# Patient Record
(demographics unavailable — no encounter records)

---

## 2024-10-11 NOTE — PHYSICAL EXAM
[Normal Appearance] : normal appearance [Well Groomed] : well groomed [General Appearance - In No Acute Distress] : no acute distress [Normal Station and Gait] : the gait and station were normal for the patient's age [] : no rash [Affect] : the affect was normal [Mood] : the mood was normal [de-identified] : +significant scar tissue at SPT site, midline incision well healed [de-identified] : +capacious urethra, atrophy  [Chaperone Present] : A chaperone was present in the examining room during all aspects of the physical examination [15118] : A chaperone was present during the pelvic exam. [FreeTextEntry2] : Ml PATEL

## 2024-10-11 NOTE — ASSESSMENT
[FreeTextEntry1] : 80-year-old female with neurogenic bladder  Had a long discussion with the patient and her granddaughter.  I hesitate to inject high-dose Botox without the patient being able to perform CIC adequately.  We attempted to teacher today but she had difficulty.  They provided extra catheters for her to practice at home.  In the interim, we started her on Myrbetriq 50 mg.  I am also sending her for a renal bladder ultrasound and a BMP as she has been without her catheter in place for numerous months to ensure renal preservation.  We also discussed the use of a female external catheter especially in the evening.  This will eliminate the need for briefs and depends overnight.  Return to clinic in 4 weeks to reassess

## 2024-10-11 NOTE — REASON FOR VISIT
[TextEntry] : 80-year-old female who presents for end-stage bladder with incontinence  Patient is with her granddaughter who provides history.  She is Tori speaking.  Her history is complex.  He has a history of cervical cancer status post radiation in 1999.  About 10 years later, she developed urinary issues.  She was primarily managed at [facility].  She had a urodynamics in 2020 that showed bladder capacity less than 100 cc, elevated detrusor pressures.  She also had bilateral hydronephrosis secondary to poor compliance.  She was managed with an indwelling Crowley until she opted for suprapubic tube placement in 2022.  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  Cystogram revealed no fistula.  Creatinine 1.03 on 2/2024.  She was then managed with a urethral catheter which bothered her significantly.  She also developed recurrent UTIs.  The catheter was ultimately removed and patient has been leaking into briefs.  She wears at least 10 briefs per day.  She has trialed Gemtesa and trospium previously.  Nothing helped.  The granddaughter reports that the quality of her life is significantly better without an indwelling Crowley as she is able to be more physically active.  The patient is quite independent and functional.  She has a history of 6 vaginal deliveries. She denies difficulties with the bowels She has diabetes-she is on Jardiance, glipizide, metformin PVR maybe 3? difficult to find bladder given scar tissue   Counseled patient about options including continued leakage, replacing via the catheter, hide with Botox with CIC.  They opted for Botox 300 units.  Today on 10/11 patient presented for cystoscopy Botox.  However she felt uncomfortable with CIC.  She continues to have significant leakage requiring numerous briefs and pads per day.  This is quite bothersome to her.  She does not want the catheter replaced.  Attempted CIC teaching.  Patient was prepped and a 14 Spanish catheter was inserted.  Middle return of urine was noted.  Patient with significant leakage prior to catheter placement.

## 2024-11-14 NOTE — REASON FOR VISIT
[TextEntry] : 80-year-old female who presents for end-stage bladder with incontinence  Patient is with her granddaughter who provides history.  She is Tori speaking.  Her history is complex.  He has a history of cervical cancer status post radiation in 1999.  About 10 years later, she developed urinary issues.  She was primarily managed at [facility].  She had a urodynamics in 2020 that showed bladder capacity less than 100 cc, elevated detrusor pressures.  She also had bilateral hydronephrosis secondary to poor compliance.  She was managed with an indwelling Crowley until she opted for suprapubic tube placement in 2022.  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  Cystogram revealed no fistula.  Creatinine 1.03 on 2/2024.  She was then managed with a urethral catheter which bothered her significantly.  She also developed recurrent UTIs.  The catheter was ultimately removed and patient has been leaking into briefs.  She wears at least 10 briefs per day.  She has trialed Gemtesa and trospium previously.  Nothing helped.  The granddaughter reports that the quality of her life is significantly better without an indwelling Crowley as she is able to be more physically active.  The patient is quite independent and functional.  She has a history of 6 vaginal deliveries. She denies difficulties with the bowels She has diabetes-she is on Jardiance, glipizide, metformin PVR maybe 3? difficult to find bladder given scar tissue   Counseled patient about options including continued leakage, replacing via the catheter, hide with Botox with CIC.  They opted for Botox 300 units.  Today on 10/11 patient presented for cystoscopy Botox.  However she felt uncomfortable with CIC.  She continues to have significant leakage requiring numerous briefs and pads per day.  This is quite bothersome to her.  She does not want the catheter replaced.  Attempted CIC teaching.  Patient was prepped and a 14 Turkmen catheter was inserted.  Minimal return of urine was noted.  Patient with significant leakage prior to catheter placement.  Started on mirabegron and counseled about female external catheters.  Creat 0.92 Sent for RBUS